# Patient Record
(demographics unavailable — no encounter records)

---

## 2024-10-17 NOTE — HISTORY OF PRESENT ILLNESS
[Follow-Up - Routine Clinic] : a routine clinic follow-up of [Cough] : no cough [Purulent Sputum] : no purulent sputum [Fatigue] : no fatigue [Dyspnea] : dyspnea [Wheezing] : no wheezing [Currently Experiencing] : The patient is currently experiencing symptoms. [Home] : at home, [unfilled] , at the time of the visit. [Medical Office: ([facility])___] : at the medical office located in  [Verbal consent obtained from patient] : the patient, [unfilled]

## 2025-02-01 NOTE — PHYSICAL EXAM
[Alert] : alert [Well Nourished] : well nourished [No Acute Distress] : no acute distress [Well Developed] : well developed [Normal Sclera/Conjunctiva] : normal sclera/conjunctiva [EOMI] : extra ocular movement intact [No Proptosis] : no proptosis [Normal Oropharynx] : the oropharynx was normal [Thyroid Not Enlarged] : the thyroid was not enlarged [No Thyroid Nodules] : no palpable thyroid nodules [No Respiratory Distress] : no respiratory distress [No Accessory Muscle Use] : no accessory muscle use [Clear to Auscultation] : lungs were clear to auscultation bilaterally [Normal S1, S2] : normal S1 and S2 [Normal Rate] : heart rate was normal [Regular Rhythm] : with a regular rhythm [No Edema] : no peripheral edema [Pedal Pulses Normal] : the pedal pulses are present [Normal Bowel Sounds] : normal bowel sounds [Not Tender] : non-tender [Not Distended] : not distended [Soft] : abdomen soft [Normal Anterior Cervical Nodes] : no anterior cervical lymphadenopathy [No Spinal Tenderness] : no spinal tenderness [Spine Straight] : spine straight [No Stigmata of Cushings Syndrome] : no stigmata of Cushings Syndrome [Normal Gait] : normal gait [Normal Strength/Tone] : muscle strength and tone were normal [No Rash] : no rash [Normal Reflexes] : deep tendon reflexes were 2+ and symmetric [No Tremors] : no tremors [Oriented x3] : oriented to person, place, and time [Acanthosis Nigricans] : no acanthosis nigricans [de-identified] : pansystolic murmur.

## 2025-02-01 NOTE — ASSESSMENT
[FreeTextEntry1] : Pt with severe diarrheal syndrome and went to hospital and required antibiotics 15 minutes. The patient has a history of hypothyroidism though currently is clinically euthyroid with no hypothyroid or hyperthyroid signs or symptoms. The patient's TSH was mildly suppressed, free T4 slightly elevated and free T3 l in the normal range .Risks of subclinical hyperthyroidism (BMD,cardio etc.) and hypothyroidism (fatigue , muscle aches, weight gain etc.)  discussed in detail and given clinical euthyroid state after discussion pt. is comfortable with current dose of 100 mcg levothyroxine, but suggested decrease to 88 mcg. Pt. has Impaired fasting glucose and current HbA1c is 5.9 => 5.8.=> 5.7=> 6.3=> 6.1. We have discussed diet/exercise and risks related to diabetes in great detail. Lipid levels were reviewed with patient and importance and function of LDL, HDL and triglycerides discussed. Methods to increase HDL (exercise, fish, beans, oatmeal, legumes etc.) discussed with pt. in conjunction with measures to decrease LDL and triglycerides including diet and exercise.LDL/HDL was optimal at 54/50 from 53/46, 52/47, 75/48 & 84/53.. Current regimen of treatment to continue (rosuvastatin 20) . Risks of statins were discussed in detail.  Pt. has Calcium of 9.8 from 10.0, 9.9, 10, 9.9 & 9.8. . The previous PTH is 50 from 79, 81, 58 & 73 (N < 64) . The pt. has been counseled to take extra fluids daily. Symptoms of hypercalcemia including polydipsia, polyuria, gastrointestinal pains, kidney stones, ulcers, bone pain, weakness and constipation were all reviewed. Alternatives for treatment and prognosis were reviewed in detail.   Pt still with GI issues and previously followed by Dr Fenton with these issues. 2nd opinion suggested stress and IBS related. (Dr Coco PACHECO changed to Protonix am and Pepcid PM.) now sees [facility]. Pt c/o some arm pains.

## 2025-06-18 NOTE — HISTORY OF PRESENT ILLNESS
[Follow-Up - Routine Clinic] : a routine clinic follow-up of [Cough] : no cough [Purulent Sputum] : no purulent sputum [Fatigue] : no fatigue [Dyspnea] : dyspnea [Wheezing] : no wheezing [Currently Experiencing] : The patient is currently experiencing symptoms.